# Patient Record
Sex: FEMALE
[De-identification: names, ages, dates, MRNs, and addresses within clinical notes are randomized per-mention and may not be internally consistent; named-entity substitution may affect disease eponyms.]

---

## 2023-08-03 ENCOUNTER — HOSPITAL ENCOUNTER (EMERGENCY)
Dept: HOSPITAL 97 - ER | Age: 33
Discharge: HOME | End: 2023-08-03
Payer: SELF-PAY

## 2023-08-03 VITALS — OXYGEN SATURATION: 100 % | DIASTOLIC BLOOD PRESSURE: 92 MMHG | SYSTOLIC BLOOD PRESSURE: 144 MMHG

## 2023-08-03 VITALS — TEMPERATURE: 97.7 F

## 2023-08-03 DIAGNOSIS — F15.180: ICD-10-CM

## 2023-08-03 DIAGNOSIS — E86.0: Primary | ICD-10-CM

## 2023-08-03 DIAGNOSIS — T43.8X5A: ICD-10-CM

## 2023-08-03 LAB
ALBUMIN SERPL BCP-MCNC: 4.5 G/DL (ref 3.4–5)
ALP SERPL-CCNC: 69 U/L (ref 45–117)
ALT SERPL W P-5'-P-CCNC: 41 U/L (ref 13–56)
AST SERPL W P-5'-P-CCNC: 50 U/L (ref 15–37)
BUN BLD-MCNC: 5 MG/DL (ref 7–18)
GLUCOSE SERPLBLD-MCNC: 115 MG/DL (ref 74–106)
HCT VFR BLD CALC: 40.1 % (ref 36–45)
LYMPHOCYTES # SPEC AUTO: 0.8 K/UL (ref 0.7–4.9)
MCV RBC: 82.8 FL (ref 80–100)
PMV BLD: 8 FL (ref 7.6–11.3)
POTASSIUM SERPL-SCNC: 3.4 MEQ/L (ref 3.5–5.1)
RBC # BLD: 4.84 M/UL (ref 3.86–4.86)
TSH SERPL DL<=0.05 MIU/L-ACNC: 3.46 UIU/ML (ref 0.36–3.74)

## 2023-08-03 PROCEDURE — 99285 EMERGENCY DEPT VISIT HI MDM: CPT

## 2023-08-03 PROCEDURE — 85025 COMPLETE CBC W/AUTO DIFF WBC: CPT

## 2023-08-03 PROCEDURE — 96374 THER/PROPH/DIAG INJ IV PUSH: CPT

## 2023-08-03 PROCEDURE — 93005 ELECTROCARDIOGRAM TRACING: CPT

## 2023-08-03 PROCEDURE — 36415 COLL VENOUS BLD VENIPUNCTURE: CPT

## 2023-08-03 PROCEDURE — 80053 COMPREHEN METABOLIC PANEL: CPT

## 2023-08-03 PROCEDURE — 84443 ASSAY THYROID STIM HORMONE: CPT

## 2023-08-03 NOTE — EDPHYS
Physician Documentation                                                                           

 [facility]                                                                 

Name: Marj Person                                                                            

Age: 33 yrs                                                                                       

Sex: Female                                                                                       

: 1990                                                                                   

MRN: [id number]                                                                                   

Arrival Date: 2023                                                                          

Time: 19:05                                                                                       

Account#: [id number]                                                                            

Bed 19                                                                                            

Private MD:                                                                                       

ED Physician Geovani Aleman                                                                         

HPI:                                                                                              

                                                                                             

19:28 This 33 yrs old Female presents to ER via Wheelchair with complaints of Chest Pain,     snw 

      Shortness Of Breath.                                                                        

19:28 The patient presents with a history of heart racing. Context: The symptoms occur pt     snw 

      states she stopped Paxil last month post losing insurance. States she doesn't know why      

      her palpitations started again, but pt then states she used Cocaine yesterday. States       

      it must have been laced with something.. Onset: The symptoms/episode began/occurred         

      suddenly, yesterday. Duration: The patient or guardian reports a single episode.            

      Associated signs and symptoms: Pertinent positives: anxiety, lightheadedness, SOB,          

      hyperventilation. Severity of symptoms: At their worst the symptoms were moderate. The      

      patient has experienced similar episodes in the past. It is unknown whether or not the      

      patient has recently seen a physician.                                                      

                                                                                                  

OB/GYN:                                                                                           

19:48 LMP 2023                                                                           [id number] 

                                                                                                  

Historical:                                                                                       

- Allergies:                                                                                      

19:14 No Known Allergies;                                                                     jw7 

- PMHx:                                                                                           

19:14 Anxiety;                                                                                jw7 

- PSHx:                                                                                           

19:14 Tonsillectomy; ectpic pregnancy;                                                        jw7 

                                                                                                  

- Immunization history:: Client reports having NOT received the Covid vaccine.                    

- Social history:: Smoking status: Patient/guardian denies using tobacco, Patient uses            

  street drugs, cocaine.                                                                          

                                                                                                  

                                                                                                  

ROS:                                                                                              

19:28 Constitutional: Negative for fever, chills, positive 10# weight loss, Eyes: Negative    snw 

      for injury, pain, redness, and discharge, ENT: Negative for injury, pain, and               

      discharge, Neck: Negative for injury, pain, and swelling.                                   

19:28 Abdomen/GI: Negative for abdominal pain, nausea, vomiting, diarrhea, and constipation,      

      Back: Negative for injury and pain, : Negative for injury, bleeding, discharge, and       

      swelling, MS/Extremity: Negative for injury and deformity, Skin: Negative for injury,       

      rash, and discoloration, Neuro: Negative for headache, weakness, numbness, tingling,        

      and seizure.                                                                                

19:28 Cardiovascular: Positive for chest pain, palpitations.                                      

19:28 Respiratory: Positive for shortness of breath, at rest. hyperventilation.                   

19:28 Psych: Positive for anxiety.                                                                

                                                                                                  

Exam:                                                                                             

19:31 Head/Face:  Normocephalic, atraumatic. Eyes:  Pupils equal round and reactive to light, snw 

      extra-ocular motions intact.  Lids and lashes normal.  Conjunctiva and sclera are           

      non-icteric and not injected.  Cornea within normal limits.  Periorbital areas with no      

      swelling, redness, or edema. ENT:  Nares patent. No nasal discharge, no septal              

      abnormalities noted.  Tympanic membranes are normal and external auditory canals are        

      clear.  Oropharynx with no redness, swelling, or masses, exudates, or evidence of           

      obstruction, uvula midline.  Mucous membranes moist. Neck:  Trachea midline, no             

      thyromegaly or masses palpated, and no cervical lymphadenopathy.  Supple, full range of     

      motion without nuchal rigidity, or vertebral point tenderness.  No Meningismus.             

      Chest/axilla:  Normal chest wall appearance and motion.  Nontender with no deformity.       

      No lesions are appreciated.                                                                 

19:31 Abdomen/GI:  Soft, non-tender, with normal bowel sounds.  No distension or tympany.  No     

      guarding or rebound.  No evidence of tenderness throughout. Back:  No spinal                

      tenderness.  No costovertebral tenderness.  Full range of motion. Skin:  Warm, dry with     

      normal turgor.  Normal color with no rashes, no lesions, and no evidence of cellulitis.     

      MS/ Extremity:  Pulses equal, no cyanosis.  Neurovascular intact.  Full, normal range       

      of motion. Neuro:  Awake and alert, GCS 15, oriented to person, place, time, and            

      situation.  Cranial nerves II-XII grossly intact.  Motor strength 5/5 in all                

      extremities.  Sensory grossly intact.  Cerebellar exam normal.  Normal gait.                

19:31 Constitutional: The patient appears alert, anxious, restless.                               

19:31 Cardiovascular: Rate: tachycardic, Rhythm: regular.                                         

19:31 Respiratory: the patient does not display signs of respiratory distress,  Respirations:     

      shallow respirations, tachypnea, Breath sounds: are clear throughout.                       

19:31 Psych: Behavior/mood is anxious, Affect is animated, Oriented to person, place, time.       

                                                                                                  

Vital Signs:                                                                                      

19:07  / 105; Pulse 126; Resp 24 S; Temp 97.7; Pulse Ox 100% on R/A; Weight 58.06 kg;   jw7 

      Height 5 ft. 2 in. ; Pain 9/10;                                                             

19:50  / 85; Pulse 110; Resp 20 S; Pulse Ox 100% on R/A;                                ha1 

20:29  / 87; Pulse 107; Resp 18 S; Pulse Ox 95% on R/A;                                 ha1 

21:23  / 92; Pulse 93; Resp 18 S; Pulse Ox 100% on R/A;                                 ha1 

19:07 Body Mass Index 23.41 (58.06 kg, 157.48 cm)                                             7 

19:07 Pain Scale: Adult                                                                       jw7 

                                                                                                  

MDM:                                                                                              

19:16 Patient medically screened.                                                             snw 

19:27 Differential diagnosis: viral Infection, bacterial infection, substance abuse. Data     snw 

      reviewed: vital signs, nurses notes, lab test result(s), EKG. I considered the              

      following discharge prescriptions or medication management in the emergency department      

      Medications were administered in the Emergency Department. See MAR. Counseling: I had a     

      detailed discussion with the patient and/or guardian regarding: the historical points,      

      exam findings, and any diagnostic results supporting the discharge/admit diagnosis, the     

      presence of at least one elevated blood pressure reading (>120/80) during this              

      emergency department visit, lab results, the need for outpatient follow up. Special         

      discussion: Cocaine abuse.                                                                  

19:32 Differential diagnosis: arrythmia, dehydration, stress disorder, drug abuse. Historians snw 

      other than the Patient: Spouse/Significant Other: Male significant other.                   

22:08 ED course: Pt paranoid, insists that she would like to leave as we have spoken poorly   snw 

      about her and have called the police on her. Offered valium IV s/p drug ingestion and       

      paranoia from yesterday. Pt states she does not want us to give her anything else and       

      wants to leave.                                                                             

                                                                                                  

                                                                                             

19:27 Order name: CBC with Diff; Complete Time: 20:02                                         snw 

                                                                                             

19:27 Order name: CMP; Complete Time: 20:17                                                   snw 

                                                                                             

19:27 Order name: TSH; Complete Time: 20:17                                                   snw 

                                                                                             

20:16 Order name: EKG; Complete Time: 20:17                                                   snw 

                                                                                             

20:16 Order name: EKG - Nurse/Tech; Complete Time: 20:29                                      snw 

                                                                                             

21:22 Order name: Recheck Vital Signs; Complete Time: 21:22                                   snw 

                                                                                                  

EC:31 Rate is 125 beats/min. Rhythm is regular. QRS Axis is Normal. FL interval is normal.    snw 

      QRS interval is normal. QT interval is normal. No Q waves. T waves are Normal. Clinical     

      impression: Sinus tachycardia.                                                              

                                                                                                  

Administered Medications:                                                                         

20:20 Drug: NS 0.9% IV 1000 ml Route: IV; Rate: 1 bolus; Site: left antecubital;              ha1 

20:44 Drug: NS 0.9% IV 1000 ml Route: IV; Rate: 1 bolus; Site: left antecubital;              ha1 

20:50 Drug: Propranolol PO 40 mg Route: PO;                                                   ha1 

22:13 Drug: Diazepam IVP 5 mg Route: IVP; Site: left antecubital;                             ha1 

                                                                                                  

                                                                                                  

Disposition Summary:                                                                              

23 22:11                                                                                    

Discharge Ordered                                                                                 

      Location: Home                                                                          snw 

      Condition: Stable                                                                       snw 

      Diagnosis                                                                                   

        - Dehydration                                                                         snw 

        - Adverse effect of other psychotropic drugs                                          snw 

        - Other stimulant abuse with stimulant-induced anxiety disorder                       snw 

      Followup:                                                                               snw 

        - With: Emergency Department                                                               

        - When: As needed                                                                          

        - Reason: Worsening of condition                                                           

      Followup:                                                                               snw 

        - With: Private Physician                                                                  

        - When: Tomorrow                                                                           

        - Reason: Recheck today's complaints, Continuance of care, Re-evaluation by your           

      physician                                                                                   

      Discharge Instructions:                                                                     

        - Discharge Summary Sheet                                                             snw 

        - Cocaine Use Disorder                                                                snw 

        - Dehydration, Adult                                                                  snw 

        - Substance Use Disorder                                                              snw 

        - Rehydration, Adult                                                                  snw 

      Forms:                                                                                      

        - Medication Reconciliation Form                                                      snw 

        - Thank You Letter                                                                    snw 

        - Antibiotic Education                                                                snw 

        - Prescription Opioid Use                                                             snw 

        - Patient Portal Instructions                                                         snw 

      Prescriptions:                                                                              

        - Propranolol 20 mg Oral Tablet                                                            

            - take 1 tablet by ORAL route every 6 hours; 20 tablet; Refills: 0, Product       snw 

      Selection Permitted                                                                         

Addendum:                                                                                         

2023                                                                                        

     10:53 Co-signature as Attending Physician, Geovani Aleman MD I reviewed the patient's care       r
n

           provided by the Advanced Practice Provider and agree with the diagnosis and treatment  

           plan.                                                                                  

                                                                                                  

Signatures:                                                                                       

Dispatcher [employer]                           Mirian Spivey, MARYP-C                   FNP-Csnw                                                  

Geovani Aleman MD MD rn Waits, Jodi, RN                         RN   jw7                                                  

Brunilda Thakkar RN                        RN   ha1                                                  

                                                                                                  

**************************************************************************************************

## 2023-08-03 NOTE — ER
Nurse's Notes                                                                                     

 [facility]                                                                 

Name: Marj Person                                                                            

Age: 33 yrs                                                                                       

Sex: Female                                                                                       

: 1990                                                                                   

MRN: [id number]                                                                                   

Arrival Date: 2023                                                                          

Time: 19:05                                                                                       

Account#: [id number]                                                                            

Bed 19                                                                                            

Private MD:                                                                                       

Diagnosis: Dehydration;Adverse effect of other psychotropic drugs;Other stimulant abuse with      

  stimulant-induced anxiety disorder                                                              

                                                                                                  

Presentation:                                                                                     

                                                                                             

19:07 Chief complaint: Patient states: Chest pain, started yesterday with numbness and        jw7 

      tingling to left arm. pt admits to doing cocaine last night and is worried it was laced     

      with fentanyl. Coronavirus screen: At this time, the client does not indicate any           

      symptoms associated with coronavirus-19. Ebola Screen: No symptoms or risks identified      

      at this time. Initial Sepsis Screen: Does the patient meet any 2 criteria? RR > 20 per      

      min. HR > 90 bpm. Does the patient have a suspected source of infection? No. Patient's      

      initial sepsis screen is negative. Initial Sepsis Screen: Does the patient meet any 2       

      criteria?. Risk Assessment: Do you want to hurt yourself or someone else? Patient           

      reports no desire to harm self or others. Onset of symptoms was 2023.             

19:07 Method Of Arrival: Wheelchair                                                           jw7 

19:07 Acuity: HEIDI 2                                                                           jw7 

                                                                                                  

Triage Assessment:                                                                                

19:10 General: Appears distressed, Behavior is anxious, restless. Pain: Complains of pain in  as6 

      chest. Cardiovascular: Rhythm is sinus tachycardia. Respiratory: Respiratory pattern is     

      hyperventilation.                                                                           

                                                                                                  

OB/GYN:                                                                                           

19:48 LMP 2023                                                                           as6 

                                                                                                  

Historical:                                                                                       

- Allergies:                                                                                      

19:14 No Known Allergies;                                                                     jw7 

- PMHx:                                                                                           

19:14 Anxiety;                                                                                jw7 

- PSHx:                                                                                           

19:14 Tonsillectomy; ectpic pregnancy;                                                        jw7 

                                                                                                  

- Immunization history:: Client reports having NOT received the Covid vaccine.                    

- Social history:: Smoking status: Patient/guardian denies using tobacco, Patient uses            

  street drugs, cocaine.                                                                          

                                                                                                  

                                                                                                  

Screenin:50 [facility] ED Fall Risk Assessment (Adult) History of falling in the last 3 months,       ha1 

      including since admission No falls in past 3 months (0 pts) Confusion or Disorientation     

      No (0 pts) Intoxicated or Sedated No (0 pts) Impaired Gait No (0 pts) Mobility Assist       

      Device Used No (0 pt) Altered Elimination No (0 pt) Score/Fall Risk Level 0 - 2 = Low       

      Risk Oriented to surroundings, Maintained a safe environment, Educated pt \T\ family on     

      fall prevention, incl call for assistance when getting out of bed, Hourly rounding          

      (assess needs \T\ fall precautionary measures) done. Abuse screen: Denies threats or        

      abuse. Denies injuries from another. Nutritional screening: No deficits noted.              

      Tuberculosis screening: No symptoms or risk factors identified.                             

                                                                                                  

Assessment:                                                                                       

19:37 General: Appears uncomfortable, Behavior is cooperative, anxious. Pain: Complains of    ha1 

      pain in chest Pain does not radiate. Pain currently is 5 out of 10 on a pain scale.         

      Quality of pain is described as pressure, Pain began 2 hours ago. Neuro: Level of           

      Consciousness is awake, alert, obeys commands, Oriented to person, place, time,             

      situation. Cardiovascular: Reports palpitations, Heart tones S1 S2 present Patient's        

      skin is warm and dry. Rhythm is sinus tachycardia. Respiratory: Airway is patent            

      Respiratory effort is even, unlabored, Respiratory pattern is regular, symmetrical. GI:     

      No signs and/or symptoms were reported involving the gastrointestinal system. : No        

      signs and/or symptoms were reported regarding the genitourinary system.                     

      Musculoskeletal: Circulation, motion, and sensation intact. Range of motion: intact in      

      all extremities.                                                                            

20:35 Reassessment: Patient and/or family updated on plan of care and expected duration. Pain ha1 

      level reassessed. Patient is alert, oriented x 3, equal unlabored respirations, skin        

      warm/dry/pink.                                                                              

21:35 Reassessment: Patient and/or family updated on plan of care and expected duration. Pain ha1 

      level reassessed. Patient is alert, oriented x 3, equal unlabored respirations, skin        

      warm/dry/pink.                                                                              

                                                                                                  

Vital Signs:                                                                                      

19:07  / 105; Pulse 126; Resp 24 S; Temp 97.7; Pulse Ox 100% on R/A; Weight 58.06 kg;   jw7 

      Height 5 ft. 2 in. ; Pain 9/10;                                                             

19:50  / 85; Pulse 110; Resp 20 S; Pulse Ox 100% on R/A;                                ha1 

20:29  / 87; Pulse 107; Resp 18 S; Pulse Ox 95% on R/A;                                 ha1 

21:23  / 92; Pulse 93; Resp 18 S; Pulse Ox 100% on R/A;                                 ha1 

19:07 Body Mass Index 23.41 (58.06 kg, 157.48 cm)                                             jw7 

19:07 Pain Scale: Adult                                                                       jw7 

                                                                                                  

ED Course:                                                                                        

19:06 Patient arrived in ED.                                                                  am2 

19:14 Triage completed.                                                                       jw7 

19:16 [facility]                                                          snw 

19:16 Geovani Aleman MD is Attending Physician.                                                snw 

19:42 No provider procedures requiring assistance completed. Inserted saline lock: 20 gauge   pf1 

      in left antecubital area, using aseptic technique. Blood collected.                         

19:46 TSH Sent.                                                                               pf1 

19:46 CMP Sent.                                                                               pf1 

19:46 CBC with Diff Sent.                                                                     pf1 

19:49 Arm band placed on.                                                                     as6 

19:50 Patient maintains SpO2 saturation greater than 95% on room air.                         ha1 

19:50 Patient has correct armband on for positive identification. Placed in gown. Bed in low  ha1 

      position. Call light in reach. Side rails up X 1. Adult w/ patient.                         

19:50 Client placed on continuous cardiac and pulse oximetry monitoring. NIBP monitoring      ha1 

      applied.                                                                                    

20:00 Door closed. Warm blanket given. Pillow given.                                          ha1 

21:08 Brunilda Thakkar, RN is Primary Nurse.                                                      ha1 

22:39 IV discontinued, intact, bleeding controlled, No redness/swelling at site. Pressure     pf1 

      dressing applied.                                                                           

22:40 Provided Education on: medication .                                                     pf1 

                                                                                                  

Administered Medications:                                                                         

20:20 Drug: NS 0.9% IV 1000 ml Route: IV; Rate: 1 bolus; Site: left antecubital;              ha1 

20:44 Drug: NS 0.9% IV 1000 ml Route: IV; Rate: 1 bolus; Site: left antecubital;              ha1 

20:50 Drug: Propranolol PO 40 mg Route: PO;                                                   ha1 

22:13 Drug: Diazepam IVP 5 mg Route: IVP; Site: left antecubital;                             ha1 

                                                                                                  

                                                                                                  

Medication:                                                                                       

21:48 VIS not applicable for this client.                                                     ha1 

                                                                                                  

Outcome:                                                                                          

22:11 Discharge ordered by MD.                                                                snw 

22:39 Discharged to home ambulatory, with family.                                             pf1 

22:39 Condition: improved                                                                         

22:39 Discharge instructions given to patient, family, Instructed on discharge instructions,      

      follow up and referral plans. Demonstrated understanding of instructions, follow-up         

      care, medications, Prescriptions given X 1.                                                 

22:41 Patient left the ED.                                                                    pf1 

                                                                                                  

Signatures:                                                                                       

Mirian Garza, MARYP-C                   FNP-Csnw                                                  

Shavonne Fernandez                               am2                                                  

Shaun Cool, RN                      RN   as6                                                  

Emilia Sanchez RN                         RN   jw7                                                  

Brunilda Thakkar, TOPHER                        RN   ha1                                                  

Evita Woods RN                      RN   pf1                                                  

                                                                                                  

Corrections: (The following items were deleted from the chart)                                    

19:48 19:07 Chief complaint: Patient states: Chest pain, started yesterday with numbness and  as6 

      tingling to left arm. jw7                                                                   

                                                                                                  

**************************************************************************************************

## 2023-08-07 NOTE — EKG
Test Date:    2023-08-03               Test Time:    19:12:22

Technician:   AS                                     

                                                     

MEASUREMENT RESULTS:                                       

Intervals:                                           

Rate:         125                                    

NY:           120                                    

QRSD:         68                                     

QT:           308                                    

QTc:          444                                    

Axis:                                                

P:            61                                     

NY:           120                                    

QRS:          70                                     

T:            40                                     

                                                     

INTERPRETIVE STATEMENTS:                                       

                                                     

Sinus tachycardia

Otherwise normal ECG

No previous ECG available for comparison



Electronically Signed On 08-07-23 13:10:05 CDT by Young Jones